# Patient Record
Sex: MALE | Race: WHITE | NOT HISPANIC OR LATINO | Employment: FULL TIME | URBAN - METROPOLITAN AREA
[De-identification: names, ages, dates, MRNs, and addresses within clinical notes are randomized per-mention and may not be internally consistent; named-entity substitution may affect disease eponyms.]

---

## 2019-05-31 ENCOUNTER — TELEPHONE (OUTPATIENT)
Dept: SCHEDULING | Facility: IMAGING CENTER | Age: 19
End: 2019-05-31

## 2019-08-14 ENCOUNTER — TELEPHONE (OUTPATIENT)
Dept: MEDICAL GROUP | Facility: PHYSICIAN GROUP | Age: 19
End: 2019-08-14

## 2019-08-16 ENCOUNTER — OFFICE VISIT (OUTPATIENT)
Dept: MEDICAL GROUP | Facility: PHYSICIAN GROUP | Age: 19
End: 2019-08-16
Payer: COMMERCIAL

## 2019-08-16 VITALS
HEART RATE: 71 BPM | TEMPERATURE: 98.1 F | OXYGEN SATURATION: 97 % | BODY MASS INDEX: 21.53 KG/M2 | WEIGHT: 134 LBS | DIASTOLIC BLOOD PRESSURE: 68 MMHG | HEIGHT: 66 IN | SYSTOLIC BLOOD PRESSURE: 124 MMHG

## 2019-08-16 DIAGNOSIS — F41.8 DEPRESSION WITH ANXIETY: ICD-10-CM

## 2019-08-16 DIAGNOSIS — Z23 NEED FOR VACCINATION: ICD-10-CM

## 2019-08-16 DIAGNOSIS — K58.8 OTHER IRRITABLE BOWEL SYNDROME: ICD-10-CM

## 2019-08-16 PROCEDURE — 99214 OFFICE O/P EST MOD 30 MIN: CPT | Mod: 25 | Performed by: FAMILY MEDICINE

## 2019-08-16 PROCEDURE — 90621 MENB-FHBP VACC 2/3 DOSE IM: CPT | Performed by: FAMILY MEDICINE

## 2019-08-16 PROCEDURE — 90471 IMMUNIZATION ADMIN: CPT | Performed by: FAMILY MEDICINE

## 2019-08-16 RX ORDER — BUSPIRONE HYDROCHLORIDE 10 MG/1
TABLET ORAL
Qty: 60 TAB | Refills: 5 | Status: SHIPPED | OUTPATIENT
Start: 2019-08-16 | End: 2020-05-23

## 2019-08-16 RX ORDER — CITALOPRAM 40 MG/1
40 TABLET ORAL DAILY
COMMUNITY
End: 2019-08-16 | Stop reason: SDUPTHER

## 2019-08-16 RX ORDER — CITALOPRAM 40 MG/1
40 TABLET ORAL DAILY
Qty: 30 TAB | Refills: 11 | Status: SHIPPED | OUTPATIENT
Start: 2019-08-16 | End: 2020-11-30

## 2019-08-16 SDOH — HEALTH STABILITY: MENTAL HEALTH: HOW OFTEN DO YOU HAVE A DRINK CONTAINING ALCOHOL?: NEVER

## 2019-08-16 ASSESSMENT — PATIENT HEALTH QUESTIONNAIRE - PHQ9: CLINICAL INTERPRETATION OF PHQ2 SCORE: 0

## 2019-08-16 NOTE — PROGRESS NOTES
"CC: Depression anxiety    HISTORY OF THE PRESENT ILLNESS: Patient is a 18 y.o. male. This pleasant patient is here today to establish care and discuss health problems below.    Depression and anxiety: This is been an ongoing issue for the patient.  He previously followed with a psychiatrist in [place].  Since moving here he would like to establish with a psychiatrist.  He does take Celexa 40 mg daily.  He feels like this helps significantly with his depressive symptoms but his anxiety is still really severe.  Mostly social anxiety.  He really wants to go to college, but instead feels too nervous around people to do so.  He is hoping for referral to psychiatrist today and open to new medications for his anxiety.    He also reports irritable bowel syndrome.  He feels this is strongly anxiety.  He does state that he was tested for food allergies and had a colonoscopy last year.  He states that he was told that he is \"allergic to gluten but that has nothing to do with his diarrhea\".  He has not tried an elimination diet.  He did try fiber without any relief.    Allergies: Patient has no allergy information on record.    Current Outpatient Medications Ordered in Epic   Medication Sig Dispense Refill   • busPIRone (BUSPAR) 10 MG Tab tablet Take half tablet twice daily for first three days. From day four and beyond, take one tablet twice daily. 60 Tab 5   • citalopram (CELEXA) 40 MG Tab Take 1 Tab by mouth every day. 30 Tab 11     No current [facility]-ordered facility-administered medications on file.        History reviewed. No pertinent past medical history.    History reviewed. No pertinent surgical history.    Social History     Tobacco Use   • Smoking status: Never Smoker   • Smokeless tobacco: Never Used   Substance Use Topics   • Alcohol use: Never     Frequency: Never   • Drug use: Never       Social History     Social History Narrative    Works at [employer]. Would like to go to [employer]       Family History   Problem " "Relation Age of Onset   • No Known Problems Mother    • No Known Problems Father    • No Known Problems Maternal Grandmother    • No Known Problems Maternal Grandfather    • No Known Problems Paternal Grandmother    • No Known Problems Paternal Grandfather        ROS:     - Constitutional: Negative for fever, chills, unexpected weight change, and fatigue/generalized weakness.     - HEENT: Negative for headaches, vision changes, hearing changes, ear pain, ear discharge, rhinorrhea, sinus congestion, sore throat, and neck pain.      - Respiratory: Negative for cough, sputum production, chest congestion, dyspnea, wheezing, and crackles.      - Cardiovascular: Negative for chest pain, palpitations, orthopnea, PND, and bilateral lower extremity edema.     - Gastrointestinal: Positive for diarrhea.  Negative for heartburn, nausea, vomiting, abdominal pain, hematochezia, melena, constipation, and greasy/foul-smelling stools.     - Genitourinary: Negative for dysuria, polyuria, hematuria, pyuria, urinary urgency, and urinary incontinence.     - Musculoskeletal: Negative for myalgias, back pain, and joint pain.     - Skin: Negative for rash, itching, cyanotic skin color change.     - Neurological: Negative for dizziness, tingling, tremors, focal sensory deficit, focal weakness and headaches.     - Endo/Heme/Allergies: Does not bruise/bleed easily. No polyuria or polydipsia.    - Psychiatric/Behavioral: See HPI    Exam: /68 (BP Location: Left arm, Patient Position: Sitting, BP Cuff Size: Adult)   Pulse 71   Temp 36.7 °C (98.1 °F) (Temporal)   Ht 1.676 m (5' 6\")   Wt 60.8 kg (134 lb)   SpO2 97%  Body mass index is 21.63 kg/m².    General: Well appearing, NAD  HEENT: Normocephalic. Conjunctiva clear, lids without ptosis, pupils equal and reactive to light accommodation, ears normal shape and contour,  oropharynx is without erythema, edema or exudates.   Neck: Supple without JVD. No thyromegaly or nodules  Pulmonary: " Clear to ausculation.  Normal effort. No rales, ronchi, or wheezing.  Cardiovascular: Regular rate and rhythm without murmur, rubs or gallop.   Abdomen: Soft, nontender, nondistended. Normal bowel sounds. Liver and spleen are not palpable. No rebound or guarding  Neurologic:  normal gait  Lymph: No cervical, supraclavicular  lymph nodes are palpable  Skin: Warm and dry.  No obvious lesions.  Musculoskeletal:  No extremity cyanosis, clubbing, or edema.  Psych: Mildly anxious mood and affect. Alert and oriented. Judgment and insight is normal.      Please note that this dictation was created using voice recognition software. I have made every reasonable attempt to correct obvious errors, but I expect that there are errors of grammar and possibly content that I did not discover before finalizing the note.      Assessment/Plan  Leyla was seen today for annual exam and referral needed.    Diagnoses and all orders for this visit:    Other irritable bowel syndrome  Ongoing issue for the patient, currently uncontrolled.  He has tried quite a few things related to his.  He does endorse that its worse with his anxiety which is not currently under control.  Unclear why he was told he was allergic to gluten but not to go on a gluten-free diet.  I have recommended for him at this time to go ahead and trial a gluten-free diet for the next 4 weeks and see if it helps with his diarrhea.  We will also try to get his anxiety under better control.    Depression with anxiety  Chronic uncontrolled issues for the patient.  We will go ahead and refer to psychiatry.  However, patient aware of very long wait time to get in with a psychiatrist.  We will go ahead and start buspirone in addition to his Celexa to see if this helps with his anxiety.  Follow-up in about 6 weeks to see if it is helping.  -     REFERRAL TO PSYCHIATRY  -     busPIRone (BUSPAR) 10 MG Tab tablet; Take half tablet twice daily for first three days. From day four and  beyond, take one tablet twice daily.  -     citalopram (CELEXA) 40 MG Tab; Take 1 Tab by mouth every day.    Need for vaccination  -     Meningococcal (IM) Group B    Follow-up for anxiety in 6 weeks.    Yaneth Sierra DO  [facility]

## 2019-10-04 ENCOUNTER — OFFICE VISIT (OUTPATIENT)
Dept: MEDICAL GROUP | Facility: PHYSICIAN GROUP | Age: 19
End: 2019-10-04
Payer: COMMERCIAL

## 2019-10-04 VITALS
HEIGHT: 66 IN | WEIGHT: 133.4 LBS | DIASTOLIC BLOOD PRESSURE: 68 MMHG | HEART RATE: 60 BPM | TEMPERATURE: 97.1 F | SYSTOLIC BLOOD PRESSURE: 116 MMHG | OXYGEN SATURATION: 98 % | BODY MASS INDEX: 21.44 KG/M2

## 2019-10-04 DIAGNOSIS — Z23 NEED FOR VACCINATION: ICD-10-CM

## 2019-10-04 DIAGNOSIS — Z00.00 PREVENTATIVE HEALTH CARE: ICD-10-CM

## 2019-10-04 DIAGNOSIS — K58.8 OTHER IRRITABLE BOWEL SYNDROME: ICD-10-CM

## 2019-10-04 DIAGNOSIS — F41.8 DEPRESSION WITH ANXIETY: ICD-10-CM

## 2019-10-04 PROCEDURE — 90471 IMMUNIZATION ADMIN: CPT | Performed by: FAMILY MEDICINE

## 2019-10-04 PROCEDURE — 90686 IIV4 VACC NO PRSV 0.5 ML IM: CPT | Performed by: FAMILY MEDICINE

## 2019-10-04 PROCEDURE — 99214 OFFICE O/P EST MOD 30 MIN: CPT | Mod: 25 | Performed by: FAMILY MEDICINE

## 2019-10-04 NOTE — PROGRESS NOTES
CC: Anxiety and depression    HISTORY OF THE PRESENT ILLNESS: Patient is a 18 y.o. male. This pleasant patient is here today for follow-up.    Patient is here today for follow-up on his depression and anxiety.  About a month ago, he was prescribed buspirone on top of his Celexa 40 mg which she was already taking.  He reports that he just started taking the buspirone 1 week ago and has not yet noticed a difference in mood.  He remains stable with his depression and anxiety, with anxiety being his most significant complaint.  He did not hear back regarding his referral to psychiatry.  He does have some trouble sleeping at night, but reports this is due to staying up all night playing video games.    With regard to his irritable bowel syndrome, he continues to notice that symptoms are better when he does not eat gluten.  However, he reports it being difficult to stay away from gluten due to his family is cooking.  He takes Imodium as needed.    He is also requesting lab work today.    Allergies: Patient has no known allergies.    Current Outpatient Medications Ordered in Epic   Medication Sig Dispense Refill   • busPIRone (BUSPAR) 10 MG Tab tablet Take half tablet twice daily for first three days. From day four and beyond, take one tablet twice daily. 60 Tab 5   • citalopram (CELEXA) 40 MG Tab Take 1 Tab by mouth every day. 30 Tab 11     No current [facility]-ordered facility-administered medications on file.        Past Medical History:   Diagnosis Date   • Depression        History reviewed. No pertinent surgical history.    Social History     Tobacco Use   • Smoking status: Never Smoker   • Smokeless tobacco: Never Used   Substance Use Topics   • Alcohol use: Never     Frequency: Never   • Drug use: Never       Social History     Social History Narrative    Works at [employer]. Would like to go to [employer]       Family History   Problem Relation Age of Onset   • No Known Problems Mother    • No Known Problems Father    • No Known  "Problems Maternal Grandmother    • No Known Problems Maternal Grandfather    • No Known Problems Paternal Grandmother    • No Known Problems Paternal Grandfather        ROS:     - Constitutional: Negative for fever, chills, unexpected weight change, and fatigue/generalized weakness.     - Respiratory: Negative for cough, sputum production, chest congestion, dyspnea, wheezing, and crackles.      - Cardiovascular: Negative for chest pain, palpitations, orthopnea, PND, and bilateral lower extremity edema.     Exam: /68 (BP Location: Right arm, Patient Position: Sitting, BP Cuff Size: Adult)   Pulse 60   Temp 36.2 °C (97.1 °F) (Temporal)   Ht 1.676 m (5' 6\")   Wt 60.5 kg (133 lb 6.4 oz)   SpO2 98%  Body mass index is 21.53 kg/m².    General: Well appearing, NAD  Skin: Warm and dry.  No obvious lesions.  Musculoskeletal:  No extremity cyanosis, clubbing, or edema.  Psych: Normal mood and affect. Alert and oriented. Judgment and insight is normal.    Please note that this dictation was created using voice recognition software. I have made every reasonable attempt to correct obvious errors, but I expect that there are errors of grammar and possibly content that I did not discover before finalizing the note.      Assessment/Plan  Leyla was seen today for follow-up.    Diagnoses and all orders for this visit:    Preventative health care  -     Comp Metabolic Panel; Future  -     TSH WITH REFLEX TO FT4; Future  -     Lipid Profile; Future  -     CBC WITH DIFFERENTIAL; Future  -     VITAMIN D,25 HYDROXY; Future    Depression with anxiety  Chronic uncontrolled issue for patient.  Just recently started BuSpar about a week ago, so unclear whether or not it is helping at this time.  We will continue it and follow-up in about 4 to 6 weeks.    Need for vaccination  -     Influenza Vaccine Quad Injection (PF)    Other irritable bowel syndrome   Chronic issue for patient, has already been worked up by gastroenterology " for food allergies as well as colonoscopy.  Given that his symptoms improve when he refrains from eating gluten, recommend continuing to abstain as much as possible.    Follow-up in 4 to 6 weeks for anxiety.    Yaneth Sierra DO  [facility]

## 2019-11-15 ENCOUNTER — OFFICE VISIT (OUTPATIENT)
Dept: MEDICAL GROUP | Facility: PHYSICIAN GROUP | Age: 19
End: 2019-11-15
Payer: COMMERCIAL

## 2019-11-15 VITALS
SYSTOLIC BLOOD PRESSURE: 116 MMHG | WEIGHT: 145 LBS | BODY MASS INDEX: 23.3 KG/M2 | HEIGHT: 66 IN | TEMPERATURE: 97.8 F | HEART RATE: 60 BPM | OXYGEN SATURATION: 98 % | DIASTOLIC BLOOD PRESSURE: 68 MMHG

## 2019-11-15 DIAGNOSIS — F41.8 DEPRESSION WITH ANXIETY: ICD-10-CM

## 2019-11-15 PROCEDURE — 99213 OFFICE O/P EST LOW 20 MIN: CPT | Performed by: FAMILY MEDICINE

## 2019-11-15 NOTE — PROGRESS NOTES
CC: Depression anxiety    HISTORY OF THE PRESENT ILLNESS: Patient is a 18 y.o. male. This pleasant patient is here today for follow-up.    Patient is here today for follow-up on his depression and anxiety.  He reports that he is feeling significantly better on the BuSpar.  States that it is about 75% improvement.  Feeling more confident and less anxious.  Planning to apply to [employer] for spring semester.  Never did end up seeing psychiatry, as he did not receive a call and was unsure how to go about scheduling.  Open to seeing a psychologist for counseling and further evaluation.    Allergies: Patient has no known allergies.    Current Outpatient Medications Ordered in Epic   Medication Sig Dispense Refill   • busPIRone (BUSPAR) 10 MG Tab tablet Take half tablet twice daily for first three days. From day four and beyond, take one tablet twice daily. 60 Tab 5   • citalopram (CELEXA) 40 MG Tab Take 1 Tab by mouth every day. 30 Tab 11     No current [facility]-ordered facility-administered medications on file.        Past Medical History:   Diagnosis Date   • Depression        History reviewed. No pertinent surgical history.    Social History     Tobacco Use   • Smoking status: Never Smoker   • Smokeless tobacco: Never Used   Substance Use Topics   • Alcohol use: Never     Frequency: Never   • Drug use: Never       Social History     Patient does not qualify to have social determinant information on file (likely too young).   Social History Narrative    Works at [employer]. Would like to go to [employer]       Family History   Problem Relation Age of Onset   • No Known Problems Mother    • No Known Problems Father    • No Known Problems Maternal Grandmother    • No Known Problems Maternal Grandfather    • No Known Problems Paternal Grandmother    • No Known Problems Paternal Grandfather        ROS:     - Constitutional: Negative for fever, chills, unexpected weight change, and fatigue/generalized weakness.     - Gastrointestinal:  "Positive for intermittent diarrhea due to IBS.     Exam: /68 (BP Location: Left arm, Patient Position: Sitting, BP Cuff Size: Adult)   Pulse 60   Temp 36.6 °C (97.8 °F) (Temporal)   Ht 1.676 m (5' 6\")   Wt 65.8 kg (145 lb)   SpO2 98%  Body mass index is 23.4 kg/m².    General: Well appearing, NAD  Skin: Warm and dry.  No obvious lesions.  Musculoskeletal:  No extremity cyanosis, clubbing, or edema.  Psych: Normal mood and affect. Alert and oriented. Judgment and insight is normal.    Please note that this dictation was created using voice recognition software. I have made every reasonable attempt to correct obvious errors, but I expect that there are errors of grammar and possibly content that I did not discover before finalizing the note.      Assessment/Plan  Leyla was seen today for follow-up.    Diagnoses and all orders for this visit:    Depression with anxiety  Chronic and now improving problems for the patient since starting BuSpar.  We will continue Celexa and BuSpar at this time.  Patient feels from a medication standpoint he is doing fairly well.  He would like to be referred to psychology for counseling so referral has been placed today.  -     REFERRAL TO BEHAVIORAL HEALTH      Follow-up in 3 months or sooner if needed.    Yaneth Sierra, DO  [facility]      "

## 2019-12-20 ENCOUNTER — HOSPITAL ENCOUNTER (OUTPATIENT)
Dept: LAB | Facility: MEDICAL CENTER | Age: 19
End: 2019-12-20
Attending: FAMILY MEDICINE
Payer: COMMERCIAL

## 2019-12-20 DIAGNOSIS — Z00.00 PREVENTATIVE HEALTH CARE: ICD-10-CM

## 2019-12-20 LAB
ALBUMIN SERPL BCP-MCNC: 4.5 G/DL (ref 3.2–4.9)
ALBUMIN/GLOB SERPL: 1.7 G/DL
ALP SERPL-CCNC: 44 U/L (ref 30–99)
ALT SERPL-CCNC: 17 U/L (ref 2–50)
ANION GAP SERPL CALC-SCNC: 7 MMOL/L (ref 0–11.9)
AST SERPL-CCNC: 19 U/L (ref 12–45)
BASOPHILS # BLD AUTO: 0.5 % (ref 0–1.8)
BASOPHILS # BLD: 0.03 K/UL (ref 0–0.12)
BILIRUB SERPL-MCNC: 1 MG/DL (ref 0.1–1.5)
BUN SERPL-MCNC: 15 MG/DL (ref 8–22)
CALCIUM SERPL-MCNC: 8.9 MG/DL (ref 8.5–10.5)
CHLORIDE SERPL-SCNC: 100 MMOL/L (ref 96–112)
CHOLEST SERPL-MCNC: 175 MG/DL (ref 100–199)
CO2 SERPL-SCNC: 27 MMOL/L (ref 20–33)
CREAT SERPL-MCNC: 0.99 MG/DL (ref 0.5–1.4)
EOSINOPHIL # BLD AUTO: 0.07 K/UL (ref 0–0.51)
EOSINOPHIL NFR BLD: 1.3 % (ref 0–6.9)
ERYTHROCYTE [DISTWIDTH] IN BLOOD BY AUTOMATED COUNT: 38.9 FL (ref 35.9–50)
FASTING STATUS PATIENT QL REPORTED: NORMAL
GLOBULIN SER CALC-MCNC: 2.6 G/DL (ref 1.9–3.5)
GLUCOSE SERPL-MCNC: 89 MG/DL (ref 65–99)
HCT VFR BLD AUTO: 44.9 % (ref 42–52)
HDLC SERPL-MCNC: 53 MG/DL
HGB BLD-MCNC: 14.5 G/DL (ref 14–18)
IMM GRANULOCYTES # BLD AUTO: 0.01 K/UL (ref 0–0.11)
IMM GRANULOCYTES NFR BLD AUTO: 0.2 % (ref 0–0.9)
LDLC SERPL CALC-MCNC: 105 MG/DL
LYMPHOCYTES # BLD AUTO: 2.46 K/UL (ref 1–4.8)
LYMPHOCYTES NFR BLD: 43.9 % (ref 22–41)
MCH RBC QN AUTO: 26.3 PG (ref 27–33)
MCHC RBC AUTO-ENTMCNC: 32.3 G/DL (ref 33.7–35.3)
MCV RBC AUTO: 81.5 FL (ref 81.4–97.8)
MONOCYTES # BLD AUTO: 0.33 K/UL (ref 0–0.85)
MONOCYTES NFR BLD AUTO: 5.9 % (ref 0–13.4)
NEUTROPHILS # BLD AUTO: 2.7 K/UL (ref 1.82–7.42)
NEUTROPHILS NFR BLD: 48.2 % (ref 44–72)
NRBC # BLD AUTO: 0 K/UL
NRBC BLD-RTO: 0 /100 WBC
PLATELET # BLD AUTO: 260 K/UL (ref 164–446)
PMV BLD AUTO: 9.2 FL (ref 9–12.9)
POTASSIUM SERPL-SCNC: 4.1 MMOL/L (ref 3.6–5.5)
PROT SERPL-MCNC: 7.1 G/DL (ref 6–8.2)
RBC # BLD AUTO: 5.51 M/UL (ref 4.7–6.1)
SODIUM SERPL-SCNC: 134 MMOL/L (ref 135–145)
TRIGL SERPL-MCNC: 87 MG/DL (ref 0–149)
WBC # BLD AUTO: 5.6 K/UL (ref 4.8–10.8)

## 2019-12-20 PROCEDURE — 84443 ASSAY THYROID STIM HORMONE: CPT

## 2019-12-20 PROCEDURE — 85025 COMPLETE CBC W/AUTO DIFF WBC: CPT

## 2019-12-20 PROCEDURE — 82306 VITAMIN D 25 HYDROXY: CPT

## 2019-12-20 PROCEDURE — 36415 COLL VENOUS BLD VENIPUNCTURE: CPT

## 2019-12-20 PROCEDURE — 80053 COMPREHEN METABOLIC PANEL: CPT

## 2019-12-20 PROCEDURE — 80061 LIPID PANEL: CPT

## 2019-12-21 LAB
25(OH)D3 SERPL-MCNC: 23 NG/ML (ref 30–100)
TSH SERPL DL<=0.005 MIU/L-ACNC: 2.23 UIU/ML (ref 0.38–5.33)

## 2019-12-23 ENCOUNTER — TELEPHONE (OUTPATIENT)
Dept: MEDICAL GROUP | Facility: PHYSICIAN GROUP | Age: 19
End: 2019-12-23

## 2019-12-23 PROBLEM — E55.9 VITAMIN D DEFICIENCY: Status: ACTIVE | Noted: 2019-12-23

## 2019-12-23 NOTE — TELEPHONE ENCOUNTER
----- Message from Yaneth Sierra D.O. sent at 12/23/2019  8:16 AM PST -----  Please call patient let him know that all of his lab work was normal with the exception of low vitamin D levels.  Based on his vitamin D levels, I recommend that he take a supplement of 2000 units daily which can be bought over-the-counter.  Please let me know if he has any questions.

## 2020-05-21 DIAGNOSIS — F41.8 DEPRESSION WITH ANXIETY: ICD-10-CM

## 2020-05-23 RX ORDER — BUSPIRONE HYDROCHLORIDE 10 MG/1
TABLET ORAL
Qty: 60 TAB | Refills: 2 | Status: SHIPPED | OUTPATIENT
Start: 2020-05-23